# Patient Record
Sex: FEMALE | Race: WHITE
[De-identification: names, ages, dates, MRNs, and addresses within clinical notes are randomized per-mention and may not be internally consistent; named-entity substitution may affect disease eponyms.]

---

## 2017-12-05 ENCOUNTER — HOSPITAL ENCOUNTER (EMERGENCY)
Dept: HOSPITAL 59 - ER | Age: 52
Discharge: HOME | End: 2017-12-05
Payer: COMMERCIAL

## 2017-12-05 DIAGNOSIS — S39.012A: Primary | ICD-10-CM
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
## 2017-12-05 LAB
APPEARANCE UR: CLEAR
BACTERIA #/AREA URNS HPF: (no result) /[HPF]
BILIRUB UR-MCNC: NEGATIVE MG/DL
COLOR UR: YELLOW
GLUCOSE UR STRIP-MCNC: NEGATIVE MG/DL
KETONES UR QL STRIP: NEGATIVE
NITRITE UR QL STRIP: NEGATIVE
PROT UR QL STRIP: NEGATIVE
RBC # UR STRIP: (no result) /UL
RBC #/AREA URNS HPF: (no result) /[HPF]
URINE LEUKOCYTE ESTERASE: NEGATIVE
UROBILINOGEN UR STRIP-ACNC: 0.2 E.U./DL (ref 0.2–1)
WBC #/AREA URNS HPF: (no result) /[HPF]

## 2017-12-05 PROCEDURE — 81001 URINALYSIS AUTO W/SCOPE: CPT

## 2017-12-05 PROCEDURE — 99282 EMERGENCY DEPT VISIT SF MDM: CPT

## 2017-12-05 NOTE — EMERGENCY DEPARTMENT RECORD
History of Present Illness





- General


Chief Complaint: Back Pain/Injury


Stated Complaint: BACK PAIN


Time Seen by Provider: 17 05:21


Source: Patient


Mode of Arrival: Ambulatory


Limitations: No limitations





- History of Present Illness


Initial Comments: 





53 yo female presents to ED for evaluation of bilateral low back pain for the 

past several days.  Patient reports that her pain symptoms are worse with 

position changes, denies urinary symptoms or recent traumatic injury.  Patient 

denies numbness, tingling, or weakness to the lower extremities.  Patient 

denies rash, fevers, or recent illness.  Patient reports that her symptoms are 

improved by remaining standing in the room.  Patient did take Ibuprofen 800 mg 

1 hours ago that has improved her symptoms.


MD Complaint: Back pain


Onset/Timin


-: Week(s)


Similar Symptoms Previously: No


Severity scale (1-10): 6


Quality: Dull


Consistency: Constant


Improves With: None


Worsens With: None


Context: Unknown


Associated Symptoms: Denies other symptoms





- Related Data


 Home Medications











 Medication  Instructions  Recorded  Confirmed  Last Taken


 


Pantoprazole Sodium [Protonix] 40 mg PO DAILY 17 Unknown








 Previous Rx's











 Medication  Instructions  Recorded


 


Diazepam [Valium] 5 mg PO Q8H PRN #15 tab 17


 


Naproxen [Naprosyn] 250 mg PO Q12H #30 tablet 17











 Allergies











Allergy/AdvReac Type Severity Reaction Status Date / Time


 


aspirin AdvReac  ITCHING Verified 17 05:13


 


hydromorphone [From Dilaudid] AdvReac  ITCHING Verified 17 05:13














Travel Screening





- Travel/Exposure Within Last 30 Days


Have you traveled within the last 30 days?: No





Review of Systems


Constitutional: Denies: Chills, Fever, Malaise, Night sweats


Eyes: Denies: Eye discharge, Eye pain


ENT: Denies: Congestion, Ear pain, Epistaxis


Respiratory: Denies: Cough, Dyspnea


Cardiovascular: Denies: Chest pain, Dyspnea on exertion


Endocrine: Denies: Fatigue, Heat or cold intolerance


Gastrointestinal: Denies: Abdominal pain, Nausea, Vomiting


Genitourinary: Denies: Dysuria, Hematuria, Incontinence, Retention


Musculoskeletal: Reports: Back pain.  Denies: Arthralgia, Gout, Joint swelling


Skin: Denies: Bruising, Change in color


Neurological: Denies: Abnormal gait, Confusion, Headache, Seizure


Psychiatric: Denies: Anxiety


Hematological/Lymphatic: Denies: Anemia, Blood Clots





Past Medical History





- SOCIAL HISTORY


Smoking Status: Current every day smoker


Alcohol Use: Occasional


Drug Use: None





- RESPIRATORY


Hx Respiratory Disorders: No





- CARDIOVASCULAR


Hx Cardio Disorders: No





- NEURO


Hx Neuro Disorders: No





- GI


Hx GI Disorders: No





- 


Hx Genitourinary Disorders: Yes


Hx Kidney Stones: Yes (hx)





- ENDOCRINE


Hx Endocrine Disorders: No





- MUSCULOSKELETAL


Hx Musculoskeletal Disorders: No





- PSYCH


Hx Psych Problems: No





- HEMATOLOGY/ONCOLOGY


Hx Hematology/Oncology Disorders: No





Family Medical History


Any Significant Family History?: No





Physical Exam





- General


General Appearance: Alert, Oriented x3, Cooperative, Mild distress (ambulating 

around room, appears comfortable when standing)


Limitations: No limitations





- Head


Head exam: Atraumatic, Normocephalic, Normal inspection


Head exam detail: negative: Abrasion, Contusion, Canela's sign, General 

tenderness, Hematoma, Laceration





- Eye


Eye exam: Normal appearance.  negative: Conjunctival injection, Periorbital 

swelling, Periorbital tenderness, Scleral icterus





- ENT


Ear exam: negative: Auricular hematoma, Auricular trauma


Nasal Exam: negative: Active bleeding, Discharge, Dried blood, Foreign body


Mouth exam: negative: Drooling, Laceration, Muffled voice, Tongue elevation





- Neck


Neck exam: Normal inspection.  negative: Meningismus, Tenderness





- Respiratory


Respiratory exam: Normal lung sounds bilaterally.  negative: Rales, Respiratory 

distress, Rhonchi, Stridor





- Cardiovascular


Cardiovascular Exam: Regular rate, Normal rhythm, Normal heart sounds





- GI/Abdominal


GI/Abdominal exam: Soft.  negative: Rebound, Rigid, Tenderness





- Rectal


Rectal exam: Deferred





- 


 exam: Deferred





- Extremities


Extremities exam: Normal inspection.  negative: Pedal edema, Tenderness





- Back


Back exam: Denies: CVA tenderness (R), CVA tenderness (L), Rash noted





- Neurological


Neurological exam: Alert, Normal gait, Oriented X3





- Psychiatric


Psychiatric exam: Normal affect, Normal mood





- Skin


Skin exam: Normal color.  negative: Abrasion


Type of lesion: negative: abrasion





Course





 Vital Signs











  17





  05:12


 


Temperature 98.8 F


 


Pulse Rate 106 H


 


Respiratory 18





Rate 


 


Blood Pressure 165/104


 


Pulse Ox 97














- Reevaluation(s)


Reevaluation #1: 





17 05:37


UA reviewed and appears negative for blood or infection.  Given the absence of 

injury or trauma, radiographs are unlikely to be of benefit.  In addition, 

symptoms appear c/w lumbar strain.  Will treat the patient symptomatically with 

Valium and Naprosyn as directed.





Disposition


Disposition: Discharge


Clinical Impression: 


Lumbar strain


Qualifiers:


 Encounter type: initial encounter Qualified Code(s): S39.012A - Strain of 

muscle, fascia and tendon of lower back, initial encounter





Disposition: Home, Self-Care


Condition: (2) Stable


Instructions:  Low Back Strain (ED)


Additional Instructions: 


Return to ED if your symptoms worsen or if you have any concerns.


Valium and Naprosyn as directed.


Follow-up with your family doctor in 3-5 days as directed.


Prescriptions: 


Diazepam [Valium] 5 mg PO Q8H PRN #15 tab


 PRN Reason: Pain - Moderate (5-7)


Naproxen [Naprosyn] 250 mg PO Q12H #30 tablet


Forms:  Patient Portal Access


Time of Disposition: 05:40





Quality





- Quality Measures


Quality Measures: N/A





- Blood Pressure Screening


Does Patient Have Any of the Following: Active Dx of HTN


Blood Pressure Classification: Hypertensive Reading


Systolic Measurement: 165


Diastolic Measurement: 104


Screening for High Blood Pressure: Patient Exclusion, Hx of HTN []

## 2019-04-01 ENCOUNTER — HOSPITAL ENCOUNTER (EMERGENCY)
Dept: HOSPITAL 59 - ER | Age: 54
LOS: 1 days | Discharge: HOME | End: 2019-04-02
Payer: COMMERCIAL
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
## 2019-04-01 DIAGNOSIS — F17.210: ICD-10-CM

## 2019-04-01 DIAGNOSIS — J20.9: ICD-10-CM

## 2019-04-01 DIAGNOSIS — S30.1XXA: Primary | ICD-10-CM

## 2019-04-01 LAB
ALBUMIN SERPL-MCNC: 4.4 G/DL (ref 4–5)
ALBUMIN/GLOB SERPL: 1.6 {RATIO} (ref 1.1–1.8)
ALP SERPL-CCNC: 64 U/L (ref 35–104)
ALT SERPL-CCNC: < 5 U/L (ref ?–33)
ANION GAP SERPL CALC-SCNC: 14 MMOL/L (ref 7–16)
APPEARANCE UR: CLEAR
AST SERPL-CCNC: 13 U/L (ref 10–35)
BACTERIA #/AREA URNS HPF: (no result) /[HPF]
BASOPHILS NFR BLD: 0.2 % (ref 0–6)
BILIRUB SERPL-MCNC: 0.3 MG/DL (ref 0.2–1)
BILIRUB UR-MCNC: NEGATIVE MG/DL
BUN SERPL-MCNC: 8 MG/DL (ref 6–20)
CO2 SERPL-SCNC: 23 MMOL/L (ref 22–29)
COLOR UR: YELLOW
CREAT SERPL-MCNC: 0.7 MG/DL (ref 0.5–0.9)
EOSINOPHIL NFR BLD: 1.9 % (ref 0–6)
EPI CELLS #/AREA URNS HPF: (no result) /[HPF]
ERYTHROCYTE [DISTWIDTH] IN BLOOD BY AUTOMATED COUNT: 15 % (ref 11.5–14.5)
EST GLOMERULAR FILTRATION RATE: > 60 ML/MIN
GLOBULIN SER-MCNC: 2.8 GM/DL (ref 1.4–4.8)
GLUCOSE SERPL-MCNC: 151 MG/DL (ref 74–109)
GLUCOSE UR STRIP-MCNC: NEGATIVE MG/DL
GRANULOCYTES NFR BLD: 60.5 % (ref 47–80)
HCT VFR BLD CALC: 38.1 % (ref 35–47)
HGB BLD-MCNC: 13 GM/DL (ref 11.6–16)
KETONES UR QL STRIP: NEGATIVE
LYMPHOCYTES NFR BLD AUTO: 29.2 % (ref 16–45)
MCH RBC QN AUTO: 30.4 PG (ref 27–33)
MCHC RBC AUTO-ENTMCNC: 34.1 G/DL (ref 32–36)
MCV RBC AUTO: 89.2 FL (ref 81–97)
MONOCYTES NFR BLD: 8.2 % (ref 0–9)
NITRITE UR QL STRIP: NEGATIVE
PLATELET # BLD: 431 K/UL (ref 130–400)
PMV BLD AUTO: 9.6 FL (ref 7.4–10.4)
PROT SERPL-MCNC: 7.2 G/DL (ref 6.6–8.7)
PROT UR QL STRIP: NEGATIVE
RBC # BLD AUTO: 4.27 M/UL (ref 3.8–5.4)
RBC # UR STRIP: (no result) /UL
RBC #/AREA URNS HPF: (no result) /[HPF]
URINE LEUKOCYTE ESTERASE: NEGATIVE
UROBILINOGEN UR STRIP-ACNC: 0.2 E.U./DL (ref 0.2–1)
WBC # BLD AUTO: 8.3 K/UL (ref 4.2–12.2)
WBC #/AREA URNS HPF: (no result) /[HPF]

## 2019-04-01 PROCEDURE — 81001 URINALYSIS AUTO W/SCOPE: CPT

## 2019-04-01 PROCEDURE — 99284 EMERGENCY DEPT VISIT MOD MDM: CPT

## 2019-04-01 PROCEDURE — 74176 CT ABD & PELVIS W/O CONTRAST: CPT

## 2019-04-01 PROCEDURE — 80053 COMPREHEN METABOLIC PANEL: CPT

## 2019-04-01 PROCEDURE — 99283 EMERGENCY DEPT VISIT LOW MDM: CPT

## 2019-04-01 PROCEDURE — 85025 COMPLETE CBC W/AUTO DIFF WBC: CPT

## 2019-04-01 PROCEDURE — 71046 X-RAY EXAM CHEST 2 VIEWS: CPT

## 2019-04-01 NOTE — EMERGENCY DEPARTMENT RECORD
History of Present Illness





- General


Chief complaint: Flank Pain


Stated complaint: SIDE PAIN WITH GROIN BRUISING


Time Seen by Provider: 19 20:55


Source: Patient


Mode of Arrival: Ambulatory


Limitations: No limitations





- History of Present Illness


Initial comments: 





55 yo female presents to ED for evaluation of ecchymosis to the right abdominal 

wall bruising x 1 day, reports recent URI symptoms with cough symptoms.  

Patient denies fevers, chills, or productive cough symptoms.  Patient denies 

the use of anticoagulation medications, denies health problems at her baseline.

  Patient is concerned about her bruising.


MD Complaint: Other


Onset/Timin


-: Hour(s)


Location: Suprapubic


Radiation: RLQ


Severity: Mild


Severity scale (1-10): 4


Quality: Aching


Consistency: Constant


Improves with: Other


Worsens with: Movement


Patient Pregnant: No


Associated Symptoms: Abdominal pain, Other





- Related Data


Sexually active: Yes


 Home Medications











 Medication  Instructions  Recorded  Confirmed  Last Taken


 


Fenofibrate Nanocrystallized 48 mg PO DAILY 19





[Fenofibrate]    








 Previous Rx's











 Medication  Instructions  Recorded


 


Naproxen [Naprosyn] 250 mg PO Q12H #30 tablet 17











 Allergies











Allergy/AdvReac Type Severity Reaction Status Date / Time


 


Iodinated Contrast- Oral and Allergy  ANAPHYLAXIS Verified 19 22:26





IV Dye     


 


aspirin AdvReac  ITCHING Verified 17 05:13


 


hydromorphone [From Dilaudid] AdvReac  ITCHING Verified 17 05:13














Travel Screening





- Travel/Exposure Within Last 30 Days


Have you traveled within the last 30 days?: No





- Travel Symptoms


Symptom Screening: None





Review of Systems


Constitutional: Denies: Chills, Fever, Malaise, Night sweats


Eyes: Denies: Eye discharge, Eye pain


ENT: Denies: Congestion, Ear pain, Epistaxis


Respiratory: Reports: Cough.  Denies: Dyspnea


Cardiovascular: Denies: Chest pain, Dyspnea on exertion


Endocrine: Denies: Fatigue, Heat or cold intolerance


Gastrointestinal: Reports: Abdominal pain.  Denies: Nausea, Vomiting


Genitourinary: Denies: Incontinence, Retention


Musculoskeletal: Denies: Arthralgia, Back pain


Skin: Reports: Bruising.  Denies: Change in color


Neurological: Denies: Abnormal gait, Confusion, Headache, Seizure


Psychiatric: Denies: Anxiety


Hematological/Lymphatic: Denies: Anemia, Blood Clots





Past Medical History





- SOCIAL HISTORY


Smoking Status: Current every day smoker


Drug Use: None





- RESPIRATORY


Hx Respiratory Disorders: No





- CARDIOVASCULAR


Hx Cardio Disorders: No





- NEURO


Hx Neuro Disorders: No





- GI


Hx GI Disorders: No





- 


Hx Genitourinary Disorders: Yes


Hx Kidney Stones: Yes (hx)





- ENDOCRINE


Hx Endocrine Disorders: No





- MUSCULOSKELETAL


Hx Musculoskeletal Disorders: No





- PSYCH


Hx Psych Problems: No





- HEMATOLOGY/ONCOLOGY


Hx Hematology/Oncology Disorders: No





Physical Exam





- General


General Appearance: Alert, Oriented x3, Cooperative, Mild distress


Limitations: No limitations





- Head


Head exam: Atraumatic, Normocephalic, Normal inspection


Head exam detail: negative: Abrasion, Contusion, Canela's sign, General 

tenderness, Hematoma, Laceration





- Eye


Eye exam: Normal appearance.  negative: Conjunctival injection, Periorbital 

swelling, Periorbital tenderness, Scleral icterus





- ENT


Ear exam: negative: Auricular hematoma, Auricular trauma


Nasal Exam: negative: Active bleeding, Discharge, Dried blood, Foreign body


Mouth exam: negative: Drooling, Laceration, Muffled voice, Tongue elevation





- Neck


Neck exam: Normal inspection.  negative: Meningismus, Tenderness





- Respiratory


Respiratory exam: Normal lung sounds bilaterally.  negative: Rales, Respiratory 

distress, Rhonchi, Stridor





- Cardiovascular


Cardiovascular Exam: Regular rate, Normal rhythm, Normal heart sounds





- GI/Abdominal


GI/Abdominal exam: Soft, Tenderness (Mild TTP RUQ, RLQ, ecchymosis present to 

the lower abdominal wall.).  negative: Rebound, Rigid





- Rectal


Rectal exam: Deferred





- 


 exam: Deferred





- Extremities


Extremities exam: Normal inspection.  negative: Pedal edema, Tenderness





- Back


Back exam: Denies: CVA tenderness (R), CVA tenderness (L)





- Neurological


Neurological exam: Alert, Normal gait, Oriented X3





- Psychiatric


Psychiatric exam: Normal affect, Normal mood





- Skin


Skin exam: Normal color.  negative: Abrasion


Type of lesion: negative: abrasion





Course





 Vital Signs











  19





  20:50


 


Temperature 99.1 F


 


Pulse Rate 113 H


 


Respiratory 18





Rate 


 


Blood Pressure 188/104


 


Pulse Ox 98














- Reevaluation(s)


Reevaluation #1: 





19 21:47


Laboratory studies were reviewed and are grossly unremarkable for an acute 

process.


Reevaluation #2: 





19 23:03


Patient returned from imaging, is resting comfortably at this time.


CXR: (Preliminary interpretation)


No acute infiltrate is present





CT imaging is pending at this time.


Reevaluation #3: 





19 00:24


CT Abdomen and Pelvis:


Small hematoma within the right rectus sheath measuring 1.9x2.6x4.8





Hgb reviewed and appears stable


Patient was updated on all results, resting comfortably and appears stable for 

discharge at this time.


Reevaluation #4: 





19 00:35


Dr. Damon called re: patient's CXR findings, 7 mm nodule left orville-hilar 

region.


Patient and her SO were updated on this result.





Medical Decision Making





- Lab Data


Result diagrams: 


 19 21:00





 19 21:00





Disposition


Disposition: Discharge


Clinical Impression: 


 Bronchitis





Rectus sheath hematoma


Qualifiers:


 Encounter type: initial encounter Qualified Code(s): S30.1XXA - Contusion of 

abdominal wall, initial encounter





Disposition: Home, Self-Care


Condition: (2) Stable


Instructions:  Hematoma (ED)


Additional Instructions: 


Return to ED if your symptoms worsen or if you have any concerns.


Ibuprofen as directed.


Follow-up with your family doctor in 3-5 days as directed.


Forms:  Patient Portal Access


Time of Disposition: 00:26





Quality





- Quality Measures


Quality Measures: N/A





- Blood Pressure Screening


Does Patient Have Any of the Following: No


Blood Pressure Classification: Hypertensive Reading


Systolic Measurement: 188


Diastolic Measurement: 104


Screening for High Blood Pressure: < First Hypertensive BP, F/U Documented > [

]


First Hypertensive Follow-up Interventions: Referral to alternative/primary 

care provider.

## 2019-04-03 NOTE — RADIOLOGY REPORT
EXAM:  CHEST, TWO VIEWS



HISTORY:  HEMATOMA RIGHT ABDOMINAL WALL, COUGHED A COUPLE TIMES FOR A COUPLE 
WEEKS. 



TECHNIQUE:  PA and lateral views of the chest were obtained.  



Comparison:  No prior chest x-ray with which to compare.  



FINDINGS:  The heart size is within normal limits.  No definite acute 
infiltrate seen.  No pleural effusion or pneumothorax evident.  Mild spurring 
in the spine.  There is about a 7 mm nodular density just lateral to the 
superior aspect of the left hilum.  I suspect this is a small granuloma.  
Comparison with old films would be useful in this regard to confirm.   



Report of the 7 mm nodule was discussed by myself with Dr. Calloway at phone 
number [phone] at the time of dictation at approximately 12:30 a.m. on 4/
02/19.  



IMPRESSION:  

1.  NO ACUTE INFILTRATE EVIDENT.  



2.  APPROXIMATELY 7 MM NODULAR DENSITY JUST LATERAL TO THE SUPERIOR ASPECT OF 
THE LEFT HILUM PROBABLY A GRANULOMA.  RECOMMEND COMPARISON WITH OLD FILMS TO 
CONFIRM.  



JOB NUMBER:  [id number]
[facility]

## 2019-04-03 NOTE — CT SCAN REPORT
EXAM:  NONCONTRAST CT OF THE ABDOMEN AND PELVIS



HISTORY:  RIGHT SIDED ABDOMINAL PAIN, LOWER ABDOMINAL BRUISING.  



TECHNIQUE:  Noncontrast CT of the abdomen and pelvis was obtained.  



Comparison:  None.  



FINDINGS:  Calcified granuloma in the left lower lobe.  



Unremarkable noncontrast appearance of the liver, gallbladder, adrenal glands, 
spleen, and pancreas. 



No focal colonic thickening or inflammatory change.  Normal appendix.  The 
stomach and small bowel are not dilated.  No free air or free fluid.  



No adnexal mass is seen.  Unremarkable appearance of the urinary bladder.  
Hyperdense collection measuring 3.4 x 5.2 x 7.3 cm in the inferior right rectus 
abdominis muscle.  Mild adjacent superficial and deep fat stranding.  



Tiny fat containing umbilical hernia.  Diastasis recti.  



Disk degeneration at L4-L5 and L5-S1.  Bilateral L5 pers defects with Grade 1 
anterolisthesis.  Disk degeneration in the lower thoracic spine.  No acute 
osseous findings.    The kidneys are unremarkable.  



IMPRESSION:  

1.  RIGHT RECTUS ABDOMINIS INTRAMUSCULAR HEMATOMA WITH MILD ADJACENT FAT 
STRANDING.  



2.  NO ACUTE FINDINGS WITHIN THE ABDOMEN OR PELVIS.  



3.  TINY FAT CONTAINING UMBILICAL HERNIA.  



4.  LOWER LUMBAR DISK DEGENERATION.  BILATERAL PARS DEFECTS AT L5 WITH GRADE 1 
ANTEROLISTHESIS.  



JOB NUMBER:  [id number]
[facility]